# Patient Record
Sex: MALE | Race: WHITE | NOT HISPANIC OR LATINO | Employment: FULL TIME | URBAN - METROPOLITAN AREA
[De-identification: names, ages, dates, MRNs, and addresses within clinical notes are randomized per-mention and may not be internally consistent; named-entity substitution may affect disease eponyms.]

---

## 2018-02-15 DIAGNOSIS — R10.30 LOWER ABDOMINAL PAIN: Primary | ICD-10-CM

## 2018-02-19 ENCOUNTER — HOSPITAL ENCOUNTER (OUTPATIENT)
Dept: RADIOLOGY | Facility: HOSPITAL | Age: 38
Discharge: HOME | End: 2018-02-19
Attending: GENERAL PRACTICE
Payer: OTHER GOVERNMENT

## 2018-02-19 DIAGNOSIS — R10.30 LOWER ABDOMINAL PAIN: ICD-10-CM

## 2018-02-19 PROCEDURE — 74176 CT ABD & PELVIS W/O CONTRAST: CPT | Mod: 26,,, | Performed by: RADIOLOGY

## 2018-02-19 PROCEDURE — 74176 CT ABD & PELVIS W/O CONTRAST: CPT | Mod: TC

## 2018-07-20 ENCOUNTER — HOSPITAL ENCOUNTER (EMERGENCY)
Facility: HOSPITAL | Age: 38
Discharge: HOME | End: 2018-07-20
Attending: EMERGENCY MEDICINE
Payer: OTHER GOVERNMENT

## 2018-07-20 VITALS
WEIGHT: 235 LBS | SYSTOLIC BLOOD PRESSURE: 116 MMHG | OXYGEN SATURATION: 98 % | BODY MASS INDEX: 31.83 KG/M2 | HEIGHT: 72 IN | HEART RATE: 54 BPM | DIASTOLIC BLOOD PRESSURE: 78 MMHG | TEMPERATURE: 98 F | RESPIRATION RATE: 18 BRPM

## 2018-07-20 DIAGNOSIS — R10.32 LEFT LOWER QUADRANT PAIN: Primary | ICD-10-CM

## 2018-07-20 LAB
ALBUMIN SERPL BCP-MCNC: 4 G/DL
ALP SERPL-CCNC: 87 U/L
ALT SERPL W/O P-5'-P-CCNC: 31 U/L
ANION GAP SERPL CALC-SCNC: 8 MMOL/L
AST SERPL-CCNC: 31 U/L
BASOPHILS # BLD AUTO: 0.01 K/UL
BASOPHILS NFR BLD: 0.2 %
BILIRUB SERPL-MCNC: 1.4 MG/DL
BUN SERPL-MCNC: 8 MG/DL
CALCIUM SERPL-MCNC: 9 MG/DL
CHLORIDE SERPL-SCNC: 108 MMOL/L
CO2 SERPL-SCNC: 21 MMOL/L
CREAT SERPL-MCNC: 0.9 MG/DL
DIFFERENTIAL METHOD: ABNORMAL
EOSINOPHIL # BLD AUTO: 0.3 K/UL
EOSINOPHIL NFR BLD: 6.6 %
ERYTHROCYTE [DISTWIDTH] IN BLOOD BY AUTOMATED COUNT: 13.2 %
EST. GFR  (AFRICAN AMERICAN): >60 ML/MIN/1.73 M^2
EST. GFR  (NON AFRICAN AMERICAN): >60 ML/MIN/1.73 M^2
GLUCOSE SERPL-MCNC: 108 MG/DL
HCT VFR BLD AUTO: 39.2 %
HGB BLD-MCNC: 14.3 G/DL
LIPASE SERPL-CCNC: 15 U/L
LYMPHOCYTES # BLD AUTO: 1.6 K/UL
LYMPHOCYTES NFR BLD: 31.5 %
MCH RBC QN AUTO: 30 PG
MCHC RBC AUTO-ENTMCNC: 36.5 G/DL
MCV RBC AUTO: 82 FL
MONOCYTES # BLD AUTO: 0.4 K/UL
MONOCYTES NFR BLD: 8.3 %
NEUTROPHILS # BLD AUTO: 2.8 K/UL
NEUTROPHILS NFR BLD: 53.2 %
PLATELET # BLD AUTO: 206 K/UL
PMV BLD AUTO: 11 FL
POTASSIUM SERPL-SCNC: 4.2 MMOL/L
PROT SERPL-MCNC: 7.2 G/DL
RBC # BLD AUTO: 4.76 M/UL
SODIUM SERPL-SCNC: 137 MMOL/L
WBC # BLD AUTO: 5.18 K/UL

## 2018-07-20 PROCEDURE — 25500020 PHARM REV CODE 255: Performed by: EMERGENCY MEDICINE

## 2018-07-20 PROCEDURE — 85025 COMPLETE CBC W/AUTO DIFF WBC: CPT

## 2018-07-20 PROCEDURE — 83690 ASSAY OF LIPASE: CPT

## 2018-07-20 PROCEDURE — 80053 COMPREHEN METABOLIC PANEL: CPT

## 2018-07-20 PROCEDURE — 99284 EMERGENCY DEPT VISIT MOD MDM: CPT

## 2018-07-20 RX ORDER — LISINOPRIL AND HYDROCHLOROTHIAZIDE 10; 12.5 MG/1; MG/1
1 TABLET ORAL DAILY
COMMUNITY

## 2018-07-20 RX ADMIN — IOHEXOL 100 ML: 350 INJECTION, SOLUTION INTRAVENOUS at 10:07

## 2018-07-20 NOTE — DISCHARGE INSTRUCTIONS
Your lab results and CT scan in the emergency department were negative for infection or any abnormality.  You will need to follow up with her primary care provider soon as possible for continued care and management.  I placed a number of a GI specialist in her discharge paperwork.  Please call make an appointment as soon as possible to be seen for symptoms. May take Tylenol as needed for pain control.    Please return to the Emergency Department for any new or worsening symptoms including: fever, chest pain, shortness of breath, loss of consciousness, dizziness, weakness, or any other concerns.     Please follow up with your Primary Care Provider within in the week. If you do not have one, you may contact the one listed on your discharge paperwork or you may also call the [facility] Appointment Desk at [phone] to schedule an appointment with one.     Please take all medication as prescribed.

## 2018-07-20 NOTE — ED PROVIDER NOTES
Encounter Date: 7/20/2018    SCRIBE #1 NOTE: I, Emanuel Meléndez, am scribing for, and in the presence of,  Tresa Hernandez NP. I have scribed the following portions of the note - Other sections scribed: ROS, HPI.       History     Chief Complaint   Patient presents with    Abdominal Pain     lower abd pain w/ blood in stool x 3 days.  denies n/v/d or fever.  denies trauma.  Hx: diverticulitis, colitis, etc.  described as bright red blood.     CC: Abdominal Pain    HPI: Patient is a 37 y.o. M with a past medical history of Chronic colitis; Diverticulitis; GERD and Hypertension who presents to the ED for evaluation of LLQ abdominal pain which began acutely last night. Over the last 2 days, he reports bloody stools and noticed drops of blood on the toilet paper. He has a history of internal hemorrhoids.  He has a history of diverticulitis with the last flare-up being approximately 3 years ago.  He denies dietary indiscretion, constipation, ASA/NSAID use, fever, chills, nausea, and/or emesis or diarrhea.  He has not established care with a local GI specialist.         The history is provided by the patient. No  was used.     Review of patient's allergies indicates:  No Known Allergies  Past Medical History:   Diagnosis Date    Diverticulitis     Hypertension      Past Surgical History:   Procedure Laterality Date    ABDOMINAL SURGERY      HERNIA REPAIR      TONSILLECTOMY       History reviewed. No pertinent family history.  Social History   Substance Use Topics    Smoking status: Current Every Day Smoker     Types: Vaping with nicotine    Smokeless tobacco: Not on file    Alcohol use Yes      Comment: ocassionally     Review of Systems   Constitutional: Negative for chills and fever.   HENT: Negative for sore throat.    Respiratory: Negative for chest tightness and shortness of breath.    Cardiovascular: Negative for chest pain and palpitations.   Gastrointestinal: Positive for abdominal pain  (LLQ) and blood in stool (intermittentlu). Negative for abdominal distention, constipation, diarrhea, nausea, rectal pain and vomiting.   Genitourinary: Negative for decreased urine volume, difficulty urinating, discharge, dysuria, frequency, testicular pain and urgency.   Musculoskeletal: Negative for back pain, myalgias, neck pain and neck stiffness.   Skin: Negative for rash.   Neurological: Negative for dizziness, weakness, light-headedness, numbness and headaches.   Hematological: Does not bruise/bleed easily.       Physical Exam     Initial Vitals [07/20/18 0833]   BP Pulse Resp Temp SpO2   132/87 (!) 55 14 98.1 °F (36.7 °C) 96 %      MAP       --         Physical Exam    Nursing note and vitals reviewed.  Constitutional: Vital signs are normal. He appears well-developed and well-nourished. He is not diaphoretic. He is cooperative.  Non-toxic appearance. He does not have a sickly appearance. He does not appear ill. No distress.   HENT:   Head: Normocephalic and atraumatic.   Right Ear: External ear normal.   Left Ear: External ear normal.   Nose: Nose normal.   Mouth/Throat: Oropharynx is clear and moist. No oropharyngeal exudate.   Eyes: Conjunctivae and EOM are normal. Pupils are equal, round, and reactive to light.   Neck: Normal range of motion and full passive range of motion without pain. Neck supple.   Cardiovascular: Normal rate, regular rhythm, normal heart sounds, intact distal pulses and normal pulses. Exam reveals no decreased pulses.    Pulmonary/Chest: Effort normal and breath sounds normal. No respiratory distress. He has no decreased breath sounds.   Abdominal: Soft. Normal appearance and bowel sounds are normal. There is no hepatosplenomegaly. There is tenderness in the left lower quadrant. There is no rigidity, no rebound, no guarding, no CVA tenderness, no tenderness at McBurney's point and negative Grider's sign. No hernia.       Genitourinary: Rectum normal. Rectal exam shows no external  hemorrhoid, no fissure, no mass, no tenderness, anal tone normal and guaiac negative stool. Guaiac negative stool. : Acceptable.  Musculoskeletal: Normal range of motion.   Lymphadenopathy:        Head (right side): No tonsillar adenopathy present.        Head (left side): No tonsillar adenopathy present.     He has no cervical adenopathy.   Neurological: He is alert and oriented to person, place, and time. He has normal strength and normal reflexes. No cranial nerve deficit or sensory deficit. Coordination and gait normal. GCS eye subscore is 4. GCS verbal subscore is 5. GCS motor subscore is 6.   Skin: Skin is warm, dry and intact. Capillary refill takes less than 2 seconds. No rash noted.   Psychiatric: He has a normal mood and affect. His behavior is normal. Judgment and thought content normal.         ED Course   Procedures  Labs Reviewed   CBC W/ AUTO DIFFERENTIAL - Abnormal; Notable for the following:        Result Value    Hematocrit 39.2 (*)     MCHC 36.5 (*)     All other components within normal limits   COMPREHENSIVE METABOLIC PANEL - Abnormal; Notable for the following:     CO2 21 (*)     Total Bilirubin 1.4 (*)     All other components within normal limits   LIPASE          Imaging Results          CT Abdomen Pelvis With Contrast (Final result)  Result time 07/20/18 11:15:57    Final result by Flaquito Lambert DO (07/20/18 11:15:57)                 Impression:      No acute intra-abdominal finding.    Hepatomegaly with probable fatty infiltration of the liver.    No signs of appendicitis.    Colonic diverticulosis without evidence for acute diverticulitis.    Remote operative change from presumed anterior abdominal wall repair      Electronically signed by: Flaquito Lambert DO  Date:    07/20/2018  Time:    11:15             Narrative:    EXAMINATION:  CT ABDOMEN PELVIS WITH CONTRAST    CLINICAL HISTORY:  LLQ abd pain;    TECHNIQUE:  Low dose axial images, sagittal and coronal  reformations were obtained from the lung bases to the pubic symphysis following the IV administration of 100 mL of Omnipaque 350 .  Oral contrast was not given.    COMPARISON:  02/19/2018.    FINDINGS:  There is no consolidation within the visualized lungs.  The liver is mildly enlarged measuring 19.4 cm in craniocaudal dimension.  There is geographic decreased attenuation of liver concerning for fatty infiltration.  The spleen and pancreas are normal in size without focal lesion.  The adrenal glands are within normal limits bilaterally.    The kidneys are normal in size with uptake of contrast bilaterally measuring approximately 9-10 cm in length bilaterally there is a subcentimeter hypodensity in the lower pole the right kidney which may represent a cyst though too small for further characterization.    No aneurysmal dilatation of the abdominal aorta.    Surgical clips along the lower abdominal wall likely sequela of prior hernia repair.    No signs of appendicitis with normal caliber appendix identified.    No calcified gallstones in the gallbladder by CT criteria.    Colonic diverticulosis without evidence for acute diverticulitis.    Degenerative change of the spine with grade 1 retrolisthesis of L5 on S1.  Stable multiple nonspecific punctate calcifications within the left scrotal/general soft tissues.  Previous left inguinal hernia no longer seen with question trace right inguinal hernia unchanged                              X-Rays:   Independently Interpreted Readings:   Other Readings:  CT scan abdomen shows  No acute intra-abdominal finding.    Hepatomegaly with probable fatty infiltration of the liver.    No signs of appendicitis.    Colonic diverticulosis without evidence for acute diverticulitis.    Remote operative change from presumed anterior abdominal wall repair          APC / Resident Notes:   This is an evaluation of a 37 y.o. male that presents to the Emergency Department for Abdominal Pain.  Physical Exam shows a non-toxic, afebrile, and well appearing male.  Tenderness to palpation to left lower quadrant, no CVA tenderness, no masses, guarding, rebound or rigidity, bowel sounds are present all quadrants and normoactive, do not suspect surgical abdomen at this time.  Patient has normal rectal tone, guaiac is negative, there is no external hemorrhoids on exam and no rectal pain during exam.  Distal pulses are intact and equal bilaterally. Guaiac negative during exam.    Vital Signs Are Reassuring.  RESULTS:  CBC and CMP unremarkable for electrolyte abnormality, renal insufficiency, anemia, leukocytosis. CT scan was performed and was negative for mesenteric ischemia, obstruction, appendicitis, cholecystitis, diverticulitis, colitis, and hernia.  It does show hepatomegaly with probable fatty infiltration of liver, chronic colonic diverticulosis and remote operative changes from presumed anterior abdominal wall repair.    My overall impression is abdominal pain. I considered, but at this time, do not suspect appendicitis, pancreatitis, mesenteric ischemia, obstruction, cholecystitis, peptic ulcer disease, pancreatitis, diverticulitis, colitis, volvulus, hernia, UTI.    ED Course:  Patient declined any medications in the emergency department or medications to go home with.  Additional D/C Information: Abdominal Pain Precautions, Tylenol as needed for pain. Patient verbalizes understanding of needing to follow up with GI specialist for continued care and management.  The diagnosis, treatment plan, instructions for follow-up and reevaluation with his PCP and GI specialist as well as ED return precautions were discussed and understanding was verbalized. All questions or concerns have been addressed.     This case was discussed with Dr. Finch who is in agreement with my assessment and plan.          Scribe Attestation:   Scribe #1: I performed the above scribed service and the documentation accurately  describes the services I performed. I attest to the accuracy of the note.    Attending Attestation:           Physician Attestation for Scribe:  Physician Attestation Statement for Scribe #1: I, Tresa Hernandez NP, reviewed documentation, as scribed by Emanuel Meléndez in my presence, and it is both accurate and complete.                    Clinical Impression:   The encounter diagnosis was Left lower quadrant pain.      Disposition:   Disposition: Discharged  Condition: Stable                        Tresa Hernandez NP  07/20/18 4446

## 2018-11-09 DIAGNOSIS — R22.9 LOCALIZED SWELLING, MASS AND LUMP, UNSPECIFIED: Primary | ICD-10-CM

## 2018-11-19 ENCOUNTER — HOSPITAL ENCOUNTER (OUTPATIENT)
Dept: RADIOLOGY | Facility: HOSPITAL | Age: 38
Discharge: HOME | End: 2018-11-19
Attending: PHYSICIAN ASSISTANT
Payer: OTHER GOVERNMENT

## 2018-11-19 DIAGNOSIS — R22.9 LOCALIZED SWELLING, MASS AND LUMP, UNSPECIFIED: ICD-10-CM

## 2018-11-19 PROCEDURE — 76870 US EXAM SCROTUM: CPT | Mod: TC

## 2018-11-19 PROCEDURE — 76870 US EXAM SCROTUM: CPT | Mod: 26,,, | Performed by: RADIOLOGY

## 2020-04-30 ENCOUNTER — HOSPITAL ENCOUNTER (EMERGENCY)
Facility: HOSPITAL | Age: 40
Discharge: HOME | End: 2020-04-30
Attending: EMERGENCY MEDICINE
Payer: OTHER GOVERNMENT

## 2020-04-30 VITALS
TEMPERATURE: 97 F | SYSTOLIC BLOOD PRESSURE: 124 MMHG | OXYGEN SATURATION: 96 % | DIASTOLIC BLOOD PRESSURE: 94 MMHG | WEIGHT: 250 LBS | HEART RATE: 63 BPM | BODY MASS INDEX: 33.13 KG/M2 | HEIGHT: 73 IN | RESPIRATION RATE: 18 BRPM

## 2020-04-30 DIAGNOSIS — R11.0 NAUSEA: ICD-10-CM

## 2020-04-30 DIAGNOSIS — R10.31 RLQ ABDOMINAL PAIN: Primary | ICD-10-CM

## 2020-04-30 LAB
ALBUMIN SERPL BCP-MCNC: 4.3 G/DL (ref 3.5–5.2)
ALP SERPL-CCNC: 108 U/L (ref 55–135)
ALT SERPL W/O P-5'-P-CCNC: 45 U/L (ref 10–44)
ANION GAP SERPL CALC-SCNC: 16 MMOL/L (ref 8–16)
AST SERPL-CCNC: 29 U/L (ref 10–40)
BASOPHILS # BLD AUTO: 0.03 K/UL (ref 0–0.2)
BASOPHILS NFR BLD: 0.3 % (ref 0–1.9)
BILIRUB SERPL-MCNC: 0.8 MG/DL (ref 0.1–1)
BILIRUB UR QL STRIP: NEGATIVE
BUN SERPL-MCNC: 10 MG/DL (ref 6–20)
CALCIUM SERPL-MCNC: 9.4 MG/DL (ref 8.7–10.5)
CHLORIDE SERPL-SCNC: 102 MMOL/L (ref 95–110)
CLARITY UR: CLEAR
CO2 SERPL-SCNC: 19 MMOL/L (ref 23–29)
COLOR UR: YELLOW
CREAT SERPL-MCNC: 1.1 MG/DL (ref 0.5–1.4)
DIFFERENTIAL METHOD: NORMAL
EOSINOPHIL # BLD AUTO: 0.3 K/UL (ref 0–0.5)
EOSINOPHIL NFR BLD: 2.8 % (ref 0–8)
ERYTHROCYTE [DISTWIDTH] IN BLOOD BY AUTOMATED COUNT: 12.4 % (ref 11.5–14.5)
EST. GFR  (AFRICAN AMERICAN): >60 ML/MIN/1.73 M^2
EST. GFR  (NON AFRICAN AMERICAN): >60 ML/MIN/1.73 M^2
GLUCOSE SERPL-MCNC: 104 MG/DL (ref 70–110)
GLUCOSE UR QL STRIP: NEGATIVE
HCT VFR BLD AUTO: 46.2 % (ref 40–54)
HGB BLD-MCNC: 15.6 G/DL (ref 14–18)
HGB UR QL STRIP: NEGATIVE
IMM GRANULOCYTES # BLD AUTO: 0.04 K/UL (ref 0–0.04)
IMM GRANULOCYTES NFR BLD AUTO: 0.4 % (ref 0–0.5)
KETONES UR QL STRIP: NEGATIVE
LEUKOCYTE ESTERASE UR QL STRIP: NEGATIVE
LIPASE SERPL-CCNC: 33 U/L (ref 4–60)
LYMPHOCYTES # BLD AUTO: 2.4 K/UL (ref 1–4.8)
LYMPHOCYTES NFR BLD: 24.3 % (ref 18–48)
MCH RBC QN AUTO: 29.4 PG (ref 27–31)
MCHC RBC AUTO-ENTMCNC: 33.8 G/DL (ref 32–36)
MCV RBC AUTO: 87 FL (ref 82–98)
MONOCYTES # BLD AUTO: 0.8 K/UL (ref 0.3–1)
MONOCYTES NFR BLD: 7.5 % (ref 4–15)
NEUTROPHILS # BLD AUTO: 6.5 K/UL (ref 1.8–7.7)
NEUTROPHILS NFR BLD: 64.7 % (ref 38–73)
NITRITE UR QL STRIP: NEGATIVE
NRBC BLD-RTO: 0 /100 WBC
PH UR STRIP: 5 [PH] (ref 5–8)
PLATELET # BLD AUTO: 246 K/UL (ref 150–350)
PMV BLD AUTO: 10.6 FL (ref 9.2–12.9)
POTASSIUM SERPL-SCNC: 3.8 MMOL/L (ref 3.5–5.1)
PROT SERPL-MCNC: 7.9 G/DL (ref 6–8.4)
PROT UR QL STRIP: NEGATIVE
RBC # BLD AUTO: 5.31 M/UL (ref 4.6–6.2)
SODIUM SERPL-SCNC: 137 MMOL/L (ref 136–145)
SP GR UR STRIP: 1.01 (ref 1–1.03)
URN SPEC COLLECT METH UR: NORMAL
UROBILINOGEN UR STRIP-ACNC: NEGATIVE EU/DL
WBC # BLD AUTO: 10.01 K/UL (ref 3.9–12.7)

## 2020-04-30 PROCEDURE — 85025 COMPLETE CBC W/AUTO DIFF WBC: CPT

## 2020-04-30 PROCEDURE — 63600175 PHARM REV CODE 636 W HCPCS: Performed by: NURSE PRACTITIONER

## 2020-04-30 PROCEDURE — 25500020 PHARM REV CODE 255: Performed by: NURSE PRACTITIONER

## 2020-04-30 PROCEDURE — 83690 ASSAY OF LIPASE: CPT

## 2020-04-30 PROCEDURE — 81003 URINALYSIS AUTO W/O SCOPE: CPT

## 2020-04-30 PROCEDURE — 96361 HYDRATE IV INFUSION ADD-ON: CPT

## 2020-04-30 PROCEDURE — 96374 THER/PROPH/DIAG INJ IV PUSH: CPT

## 2020-04-30 PROCEDURE — 80053 COMPREHEN METABOLIC PANEL: CPT

## 2020-04-30 PROCEDURE — 25000003 PHARM REV CODE 250: Performed by: EMERGENCY MEDICINE

## 2020-04-30 PROCEDURE — 99285 EMERGENCY DEPT VISIT HI MDM: CPT | Mod: 25

## 2020-04-30 PROCEDURE — 96375 TX/PRO/DX INJ NEW DRUG ADDON: CPT

## 2020-04-30 RX ORDER — ONDANSETRON 4 MG/1
4 TABLET, ORALLY DISINTEGRATING ORAL EVERY 6 HOURS PRN
Qty: 20 TABLET | Refills: 0 | Status: SHIPPED | OUTPATIENT
Start: 2020-04-30

## 2020-04-30 RX ORDER — KETOROLAC TROMETHAMINE 30 MG/ML
15 INJECTION, SOLUTION INTRAMUSCULAR; INTRAVENOUS
Status: COMPLETED | OUTPATIENT
Start: 2020-04-30 | End: 2020-04-30

## 2020-04-30 RX ORDER — ONDANSETRON 2 MG/ML
8 INJECTION INTRAMUSCULAR; INTRAVENOUS
Status: COMPLETED | OUTPATIENT
Start: 2020-04-30 | End: 2020-04-30

## 2020-04-30 RX ADMIN — ONDANSETRON HYDROCHLORIDE 8 MG: 2 SOLUTION INTRAMUSCULAR; INTRAVENOUS at 04:04

## 2020-04-30 RX ADMIN — IOHEXOL 75 ML: 350 INJECTION, SOLUTION INTRAVENOUS at 05:04

## 2020-04-30 RX ADMIN — SODIUM CHLORIDE 1000 ML: 0.9 INJECTION, SOLUTION INTRAVENOUS at 04:04

## 2020-04-30 RX ADMIN — KETOROLAC TROMETHAMINE 15 MG: 30 INJECTION, SOLUTION INTRAMUSCULAR at 04:04

## 2020-04-30 NOTE — ED PROVIDER NOTES
Encounter Date: 4/30/2020    SCRIBE #1 NOTE: I, Florecita Barrera, am scribing for, and in the presence of,  Gianni Cardozo NP. I have scribed the following portions of the note - Other sections scribed: HPI, ROS, PE.       History     Chief Complaint   Patient presents with    Abdominal Pain     Pt c/o RLQ abdominal pain, nausea that started last night. Denies vomiting, fever, chills. Pt reports hx of diverticulitis. Pain is 6-7/10     This is a 39 y.o. male with a PMHx of Diverticulitis who presents to the ED complaining of right lower abdominal pain that started last night. He reports having associated nausea. He states that this pain feels different than his Diverticulitis. He notes a past surgical history of hernia repair surgery and he denies a history of appendectomy. He denies fever and emesis. No other associated symptoms.    The history is provided by the patient. No  was used.     Review of patient's allergies indicates:  No Known Allergies  Past Medical History:   Diagnosis Date    Diverticulitis     Hypertension      Past Surgical History:   Procedure Laterality Date    ABDOMINAL SURGERY      HERNIA REPAIR      TONSILLECTOMY       No family history on file.  Social History     Tobacco Use    Smoking status: Current Every Day Smoker     Types: Vaping with nicotine   Substance Use Topics    Alcohol use: Yes     Comment: ocassionally    Drug use: Not on file     Review of Systems   Constitutional: Negative for fever.   HENT: Negative for sore throat.    Respiratory: Negative for shortness of breath.    Cardiovascular: Negative for chest pain.   Gastrointestinal: Positive for abdominal pain and nausea. Negative for vomiting.   Genitourinary: Negative for dysuria.   Musculoskeletal: Negative for back pain.   Skin: Negative for rash.   Neurological: Negative for weakness.   Hematological: Does not bruise/bleed easily.       Physical Exam     Initial Vitals [04/30/20 1549]   BP Pulse  Resp Temp SpO2   (!) 147/99 68 18 98.2 °F (36.8 °C) 96 %      MAP       --         Physical Exam    Nursing note and vitals reviewed.  Constitutional: He appears well-developed and well-nourished. No distress.   HENT:   Head: Normocephalic and atraumatic.   Right Ear: Tympanic membrane normal.   Left Ear: Tympanic membrane normal.   Nose: Nose normal.   Mouth/Throat: Uvula is midline, oropharynx is clear and moist and mucous membranes are normal.   Eyes: EOM are normal. Pupils are equal, round, and reactive to light.   Neck: Trachea normal, normal range of motion, full passive range of motion without pain and phonation normal. Neck supple. No stridor present. No spinous process tenderness and no muscular tenderness present. Normal range of motion present. No neck rigidity.   Cardiovascular: Normal rate, regular rhythm and normal heart sounds. Exam reveals no gallop and no friction rub.    No murmur heard.  Pulmonary/Chest: Effort normal and breath sounds normal. No respiratory distress. He has no wheezes. He has no rhonchi. He has no rales.   Abdominal: Soft. Bowel sounds are normal. He exhibits no mass. There is no tenderness. There is no rebound and no guarding.   Right lower quadrant tenderness to palpation with rebound tenderness.  Positive Rovsing's sign.   No rigidity or guarding.   Musculoskeletal: Normal range of motion.   Neurological: He is alert and oriented to person, place, and time. He has normal strength. No cranial nerve deficit or sensory deficit.   Skin: Skin is warm and dry. Capillary refill takes less than 2 seconds.   Psychiatric: He has a normal mood and affect.         ED Course   Procedures  Labs Reviewed   COMPREHENSIVE METABOLIC PANEL - Abnormal; Notable for the following components:       Result Value    CO2 19 (*)     ALT 45 (*)     All other components within normal limits   CBC W/ AUTO DIFFERENTIAL   LIPASE   URINALYSIS, REFLEX TO URINE CULTURE    Narrative:     Preferred Collection  Type->Urine, Clean Catch          Imaging Results          CT Abdomen Pelvis With Contrast (Final result)  Result time 04/30/20 18:09:34    Final result by Flora Alvarez MD (04/30/20 18:09:34)                 Impression:      No acute abdominal or pelvic pathology CT of the abdomen and pelvis with contrast.    Hepatic steatosis.  Hepatosplenomegaly.    Colonic diverticulosis without definite evidence for acute diverticulitis.    No evidence of acute appendicitis.      Electronically signed by: Flora Alvarez  Date:    04/30/2020  Time:    18:09             Narrative:    EXAMINATION:  CT OF ABDOMEN PELVIS WITH    CLINICAL HISTORY:  RLQ pain, appendicitis suspected;    TECHNIQUE:  5 mm enhanced axial images were obtained from the lung bases through the greater trochanters.  Seventy-five mL of Omnipaque 350 was injected.    COMPARISON:  07/20/2018    FINDINGS:  Hepatosplenomegaly is noted.  There is fatty infiltration of the liver.    The pancreas, kidneys, and adrenal glands are unremarkable. The gallbladder contains no calcified gallstones.    There is no definite evidence for abdominal adenopathy or ascites.    There are no pelvic masses or adenopathy.  There is colonic diverticulosis.  The appendix is not inflamed.    There is no free fluid in the pelvis.    There is minimal lingular atelectasis.                                 Medical Decision Making:   History:   Old Medical Records: I decided to obtain old medical records.  Differential Diagnosis:   Appendicitis, colitis, diverticulitis, bowel obstruction, others  Clinical Tests:   Lab Tests: Ordered and Reviewed  Radiological Study: Ordered and Reviewed  ED Management:  HPI and physical exam as above.    Labs are unremarkable.  CT without evidence of acute intra-abdominal pathology.  Patient's symptoms are improved and abdominal tenderness is diminished following treatment in the ED. He is tolerating PO without difficulty prior to discharge.  Uncertain  etiology of the patient's symptoms, however no evidence of emergent pathology at this time. Advised patient to follow up with his PCP for re-evaluation and further management.  ED return precautions given. All questions regarding diagnosis and plan were answered to the patient's fullest possible satisfaction. Patient expressed understanding of diagnosis, discharge instructions, and return precautions.            Patient note was created using [employer] voice dictation software.  Any errors in syntax or information may not have been identified and edited prior to signing this note.              Scribe Attestation:   Scribe #1: I performed the above scribed service and the documentation accurately describes the services I performed. I attest to the accuracy of the note.                          Clinical Impression:       ICD-10-CM ICD-9-CM   1. RLQ abdominal pain R10.31 789.03   2. Nausea R11.0 787.02         Disposition:   Disposition: Discharged  Condition: Stable     ED Disposition Condition    Discharge Stable        ED Prescriptions     Medication Sig Dispense Start Date End Date Auth. Provider    ondansetron (ZOFRAN-ODT) 4 MG TbDL Take 1 tablet (4 mg total) by mouth every 6 (six) hours as needed (Nausea). 20 tablet 4/30/2020  Gianni Cardozo NP        Follow-up Information     Follow up With Specialties Details Why Contact Info    JUAN Alvares  Call   [address] [facility] [address] [phone]      [facility] Go to  If symptoms worsen, As needed [address] [place] [address] [phone]                      Scribe Attestation: I, Gianni Cardozo NP, personally performed the services described in this documentation. All medical record entries made by the scribe were at my direction and in my presence. I have reviewed the chart and agree that the record reflects my personal performance and is accurate and complete.               Gianni MANNING  VALERIANO Cardozo  05/03/20 3625

## 2020-04-30 NOTE — DISCHARGE INSTRUCTIONS
Follow-up with your gastroenterologist and PCP as discussed.  Return to the emergency department immediately for any new or worsening symptoms.    Thank you for coming to our Emergency Department today. It is important to remember that some problems are difficult to diagnose and may not be found during your first visit. Be sure to follow up with your primary care doctor.  If you do not have one, you may contact the one listed on your discharge paperwork or you may also call the [facility] Appointment Desk at [phone] to schedule an appointment with one.     Return to the ER with any questions/concerns, new/concerning symptoms, worsening or failure to improve. Do not drive or make any important decisions for 24 hours if you have received any pain medications, sedatives or mood altering drugs during your ER visit.

## 2020-04-30 NOTE — PROVIDER PROGRESS NOTES - EMERGENCY DEPT.
Emergency Department TeleTRIAGE Encounter Note      CHIEF COMPLAINT    Chief Complaint   Patient presents with    Abdominal Pain     Pt c/o RLQ abdominal pain, nausea that started last night. Denies vomiting, fever, chills. Pt reports hx of diverticulitis. Pain is 6-7/10       VITAL SIGNS   Initial Vitals [04/30/20 1549]   BP Pulse Resp Temp SpO2   (!) 147/99 68 18 98.2 °F (36.8 °C) 96 %      MAP       --            ALLERGIES    Review of patient's allergies indicates:  No Known Allergies    PROVIDER TRIAGE NOTE  [place] for his right lower quadrant abdominal pain.  They had concern for appendicitis.  Patient denies any fever.  I have ordered labs and urine tests on the patient but will defer the decision of appropriate imaging to my colleague that evaluates the patient face to face in the emergency department and examines his abdomen      ORDERS  Labs Reviewed   CBC W/ AUTO DIFFERENTIAL   COMPREHENSIVE METABOLIC PANEL   LIPASE   URINALYSIS, REFLEX TO URINE CULTURE       ED Orders (720h ago, onward)    Start Ordered     Status Ordering Provider    04/30/20 1600 04/30/20 1553  sodium chloride 0.9% bolus 1,000 mL  Once      Ordered AMINAH MEIER    04/30/20 1554 04/30/20 1553  Vital signs  Every 2 hours      Ordered AMINAH MEIER    04/30/20 1553 04/30/20 1553  Diet NPO  Diet effective now      Ordered AMINAH MEIER    04/30/20 1553 04/30/20 1553  Insert peripheral IV  Once      Ordered AMINAH MEIER    04/30/20 1553 04/30/20 1553  CBC W/ AUTO DIFFERENTIAL  STAT  Collect    Ordered AMINAH MEIER    04/30/20 1553 04/30/20 1553  Comp. Metabolic Panel  STAT  Collect    Ordered AMINAH MEIER    04/30/20 1553 04/30/20 1553  Lipase  STAT  Collect    Ordered AMINAH MEIER    04/30/20 1553 04/30/20 1553  Urinalysis, Reflex to Urine Culture Urine, Clean Catch  STAT      Ordered AMINAH MEIER            Virtual Visit Note: The provider triage portion of this  emergency department evaluation and documentation was performed via [employer]inenect, a HIPAA-compliant telemedicine application, in concert with a tele-presenter in the room. A face to face patient evaluation with one of my colleagues will occur once the patient is placed in an emergency department room.      DISCLAIMER: This note was prepared with [employer] voice recognition transcription software. Garbled syntax, mangled pronouns, and other bizarre constructions may be attributed to that software system.

## 2020-09-26 ENCOUNTER — HOSPITAL ENCOUNTER (EMERGENCY)
Facility: HOSPITAL | Age: 40
Discharge: HOME | End: 2020-09-26
Attending: EMERGENCY MEDICINE
Payer: OTHER GOVERNMENT

## 2020-09-26 VITALS
WEIGHT: 250 LBS | OXYGEN SATURATION: 95 % | RESPIRATION RATE: 18 BRPM | HEIGHT: 73 IN | SYSTOLIC BLOOD PRESSURE: 139 MMHG | DIASTOLIC BLOOD PRESSURE: 78 MMHG | TEMPERATURE: 99 F | HEART RATE: 96 BPM | BODY MASS INDEX: 33.13 KG/M2

## 2020-09-26 DIAGNOSIS — B34.9 VIRAL INFECTION: ICD-10-CM

## 2020-09-26 DIAGNOSIS — R05.9 COUGH: ICD-10-CM

## 2020-09-26 DIAGNOSIS — U07.1 COVID-19 VIRUS INFECTION: Primary | ICD-10-CM

## 2020-09-26 DIAGNOSIS — U07.1 COVID-19 VIRUS DETECTED: ICD-10-CM

## 2020-09-26 LAB
CTP QC/QA: YES
CTP QC/QA: YES
POC MOLECULAR INFLUENZA A AGN: NEGATIVE
POC MOLECULAR INFLUENZA B AGN: NEGATIVE
SARS-COV-2 RDRP RESP QL NAA+PROBE: POSITIVE

## 2020-09-26 PROCEDURE — 87502 INFLUENZA DNA AMP PROBE: CPT

## 2020-09-26 PROCEDURE — 99283 EMERGENCY DEPT VISIT LOW MDM: CPT | Mod: 25

## 2020-09-26 PROCEDURE — U0002 COVID-19 LAB TEST NON-CDC: HCPCS | Performed by: EMERGENCY MEDICINE

## 2020-09-26 RX ORDER — OMEPRAZOLE 40 MG/1
40 CAPSULE, DELAYED RELEASE ORAL
COMMUNITY
Start: 2020-07-16 | End: 2021-07-16

## 2020-09-26 RX ORDER — ATORVASTATIN CALCIUM 20 MG/1
20 TABLET, FILM COATED ORAL
COMMUNITY

## 2020-09-26 RX ORDER — HYDROCHLOROTHIAZIDE 25 MG/1
25 TABLET ORAL
COMMUNITY

## 2020-09-27 NOTE — DISCHARGE INSTRUCTIONS
You were seen in the emergency department for a fever and upper respiratory illness.  Your flu swab is negative.  Your exam and chest x-ray are otherwise reassuring. Your COVID swab is positive. For most people, the symptoms are mild and get better within a few days.  In a small percentage of cases, symptoms can progress to worsening pneumonia, respiratory failure, and severe illness.  Please monitor yourself carefully.  Please return should you begin to experience worsening shortness of breath, difficulty breathing, dizziness, or you become concerned in any other way.    Attempt to isolate your self as much as possible until 5 days after your symptoms resolve.  Avoid contact with especially vulnerable population such as the elderly or those with chronic medical conditions.

## 2020-09-27 NOTE — ED PROVIDER NOTES
"Encounter Date: 9/26/2020    SCRIBE #1 NOTE: I, Charlette James, am scribing for, and in the presence of,  Nav Greenfield MD. I have scribed the entire note.       History     Chief Complaint   Patient presents with    COVID-19 Concerns     pt c/o congestion in right lung, dull taste, head congrestion, fever and headache.      This is a 38 y/o male with a PMHx of Hypertension. He presents to the ED with a concern for COVID-19 secondary to being exposed by colleagues who tested positive for COVID and the flu 3 day ago. Symptoms include congestion, fever, headache, myalgias, coughing, head congestion, sore throat, and lack of taste, "coins." Patient took Tylenol that brought fever from 101.4 to 100.1. He does not have a history of lung related problems. Smoked cigarettes for 22 years, quit 6 months ago. His family lives in [place], as he's a [employer] in [employer] [facility]. Patient is fully educated on how to properly quarantine. NKDA    The history is provided by the patient.     Review of patient's allergies indicates:  No Known Allergies  Past Medical History:   Diagnosis Date    Diverticulitis     Hypertension      Past Surgical History:   Procedure Laterality Date    ABDOMINAL SURGERY      HERNIA REPAIR      TONSILLECTOMY       History reviewed. No pertinent family history.  Social History     Tobacco Use    Smoking status: Current Every Day Smoker     Types: Vaping with nicotine   Substance Use Topics    Alcohol use: Yes     Comment: ocassionally    Drug use: Not on file     Review of Systems   Constitutional: Positive for fever. Negative for chills, diaphoresis and fatigue.   HENT: Positive for congestion, sinus pressure and sore throat. Negative for sinus pain.         Positive for loss of taste   Eyes: Negative for pain, discharge, redness and itching.   Respiratory: Positive for cough. Negative for shortness of breath, wheezing and stridor.    Cardiovascular: Negative for chest pain, palpitations and " leg swelling.   Gastrointestinal: Negative for abdominal pain, diarrhea, nausea and vomiting.   Genitourinary: Negative for dysuria, flank pain and frequency.   Musculoskeletal: Positive for myalgias. Negative for back pain and joint swelling.   Neurological: Positive for headaches. Negative for dizziness, syncope, facial asymmetry, speech difficulty, weakness and numbness.   Psychiatric/Behavioral: Negative for confusion.       Physical Exam     Initial Vitals [09/26/20 1959]   BP Pulse Resp Temp SpO2   137/89 91 18 99.2 °F (37.3 °C) 96 %      MAP       --         Physical Exam    Constitutional: He appears well-developed and well-nourished. He is not diaphoretic. No distress.   HENT:   Head: Normocephalic and atraumatic.   Right Ear: External ear normal.   Left Ear: External ear normal.   Eyes: EOM are normal. Pupils are equal, round, and reactive to light. Right eye exhibits no discharge. Left eye exhibits no discharge.   Neck: Normal range of motion.   Cardiovascular: Normal rate.   Pulmonary/Chest: No respiratory distress.   Speaking full sentences, no respiratory distress, unlabored breathing, satting 98% while speaking during my exam   Abdominal: He exhibits no distension. There is no guarding.   Musculoskeletal: Normal range of motion. No edema.   Neurological: He is alert and oriented to person, place, and time. He has normal strength. GCS score is 15. GCS eye subscore is 4. GCS verbal subscore is 5. GCS motor subscore is 6.   Skin: Skin is warm and dry.   Psychiatric: He has a normal mood and affect. His behavior is normal.         ED Course   Procedures  Labs Reviewed   SARS-COV-2 RDRP GENE - Abnormal; Notable for the following components:       Result Value    POC Rapid COVID Positive (*)     All other components within normal limits   POCT INFLUENZA A/B MOLECULAR          Imaging Results          X-Ray Chest 1 View (Final result)  Result time 09/26/20 20:29:59    Final result by Armando Valentin MD  (09/26/20 20:29:59)                 Impression:      No radiographic evidence of pneumonia or other source of cough, noting that early/mild viral pneumonia may be radiographically occult.      Electronically signed by: Armando Valentin MD  Date:    09/26/2020  Time:    20:29             Narrative:    EXAMINATION:  XR CHEST 1 VIEW    CLINICAL HISTORY:  Cough    TECHNIQUE:  Single frontal view of the chest was performed.    COMPARISON:  CT abdomen and pelvis 4/30/20    FINDINGS:  Symmetric appearing nipple shadows project over both lung bases.  The lungs are clear, with normal appearance of pulmonary vasculature and no pleural effusion or pneumothorax.    The cardiac silhouette is normal in size. The hilar and mediastinal contours are unremarkable.    Bones are intact.  PA and lateral views can be obtained.                                 Medical Decision Making:   Initial Assessment:   39-year-old male presenting with viral illness.  On exam, well-appearing in no acute distress.  Mild fever, no tachycardia.  No tachypnea, retractions, respiratory distress.  Satting well on room air.  He has no significant pain or other complaints concerning for other infectious cause.  Symptoms presented, mainly cough, fever, body aches likely consistent with viral process. He is COVID19 positive.  He is young and has no risk factors especially concerning for expected deterioration.  I have discussed in depth the disease course, expected reasons for concern and return precautions.  I have discussed isolation and social distancing until at least 5 days post symptom resolution and give him handouts as well.  Discussed return precautions as well as symptomatic treatment.  Patient is amenable to the plan.    Independently Interpreted Test(s):   I have ordered and independently interpreted X-rays - see prior notes.  Clinical Tests:   Lab Tests: Ordered and Reviewed  Radiological Study: Ordered and Reviewed            Scribe Attestation:    Scribe #1: I performed the above scribed service and the documentation accurately describes the services I performed. I attest to the accuracy of the note.                      Clinical Impression:       ICD-10-CM ICD-9-CM   1. COVID-19 virus infection  U07.1    2. Cough  R05 786.2   3. Viral infection  B34.9 079.99                      Disposition:   Disposition: Discharged  Condition: Stable     ED Disposition Condition    Discharge Stable        ED Prescriptions     None        Follow-up Information     Follow up With Specialties Details Why Contact Info    JUAN Alvares  Schedule an appointment as soon as possible for a visit   [address] [place] [id number] [phone]      [facility]  As needed, If symptoms worsen [address]  [facility] [id number] [phone]                    I, Nav Greenfield, personally performed the services described in this documentation. All medical record entries made by the scribe were at my direction and in my presence.  I have reviewed the chart and agree that the record reflects my personal performance and is accurate and complete.                     Nav Greenfield MD  09/26/20 [id number]

## 2020-09-27 NOTE — ED TRIAGE NOTES
Pt arrived to ED with c/o COVID-19 Concerns. Pt c/o congestion in right lung, dull taste, head congrestion, fever and headache.

## 2022-07-25 ENCOUNTER — OFFICE (OUTPATIENT)
Dept: URBAN - METROPOLITAN AREA CLINIC 19 | Facility: CLINIC | Age: 42
End: 2022-07-25
Payer: OTHER GOVERNMENT

## 2022-07-25 VITALS
DIASTOLIC BLOOD PRESSURE: 83 MMHG | OXYGEN SATURATION: 99 % | HEART RATE: 62 BPM | SYSTOLIC BLOOD PRESSURE: 127 MMHG | WEIGHT: 249 LBS | HEIGHT: 73 IN

## 2022-07-25 DIAGNOSIS — K64.9 UNSPECIFIED HEMORRHOIDS: ICD-10-CM

## 2022-07-25 DIAGNOSIS — K57.30 DIVERTICULOSIS OF LARGE INTESTINE WITHOUT PERFORATION OR ABS: ICD-10-CM

## 2022-07-25 DIAGNOSIS — K92.1 MELENA: ICD-10-CM

## 2022-07-25 PROCEDURE — 99204 OFFICE O/P NEW MOD 45 MIN: CPT

## 2022-07-25 RX ORDER — HYDROCORTISONE ACETATE 25 MG/1
SUPPOSITORY RECTAL
Qty: 14 | Refills: 1 | Status: ACTIVE
Start: 2022-07-25

## 2022-07-25 RX ORDER — HYDROCORTISONE 25 MG/G
CREAM TOPICAL
Qty: 30 | Refills: 1 | Status: ACTIVE
Start: 2022-07-25

## 2022-07-25 NOTE — SERVICEHPINOTES
Mr. Rocha is a 41-year-old male here with complaints of hematochezia, hemorrhoids, and reported history of diverticulosis. The patient reports that he has had hematochezia with a bowel movement and hemorrhoids for years. He reports he recently just had lab work performed by his primary care physician that was normal. We will obtain these records. The patient states that he has had somewhat worsening symptoms over the last year. He reports that he did have a colonoscopy performed in 2017 which revealed internal hemorrhoids, and diverticulosis. He reports that he has had diverticulitis in the past. 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 He denies a family history of IBD. He states he will have hematochezia frequently with a bowel movement. He reports the blood is bright red, and painless. He denies any abdominal pain, or fecal urgency. He reports that his hemorrhoids will sometimes protrude and cause irritation but are not currently doing so. He states he has been on a diet and recently has lost approximately 16 lb intentionally. He would like a colonoscopy performed before November.

## 2022-07-25 NOTE — SERVICENOTES
Hx diverticulosis, flex-sig 2017, recent labs normal. Takes fiber supplement daily. Blood after BM. [phone]. Banding discussed with patient

## 2022-09-29 ENCOUNTER — AMBULATORY SURGICAL CENTER (OUTPATIENT)
Dept: URBAN - METROPOLITAN AREA SURGERY 2 | Facility: SURGERY | Age: 42
End: 2022-09-29
Payer: OTHER GOVERNMENT

## 2022-09-29 VITALS
HEART RATE: 58 BPM | SYSTOLIC BLOOD PRESSURE: 143 MMHG | HEART RATE: 58 BPM | OXYGEN SATURATION: 97 % | SYSTOLIC BLOOD PRESSURE: 146 MMHG | OXYGEN SATURATION: 95 % | HEART RATE: 67 BPM | HEART RATE: 76 BPM | DIASTOLIC BLOOD PRESSURE: 85 MMHG | RESPIRATION RATE: 20 BRPM | DIASTOLIC BLOOD PRESSURE: 85 MMHG | SYSTOLIC BLOOD PRESSURE: 142 MMHG | HEART RATE: 67 BPM | DIASTOLIC BLOOD PRESSURE: 93 MMHG | SYSTOLIC BLOOD PRESSURE: 143 MMHG | RESPIRATION RATE: 16 BRPM | OXYGEN SATURATION: 95 % | OXYGEN SATURATION: 95 % | OXYGEN SATURATION: 97 % | DIASTOLIC BLOOD PRESSURE: 85 MMHG | RESPIRATION RATE: 16 BRPM | TEMPERATURE: 98.2 F | HEIGHT: 73 IN | RESPIRATION RATE: 18 BRPM | OXYGEN SATURATION: 93 % | SYSTOLIC BLOOD PRESSURE: 146 MMHG | DIASTOLIC BLOOD PRESSURE: 86 MMHG | WEIGHT: 253 LBS | RESPIRATION RATE: 18 BRPM | SYSTOLIC BLOOD PRESSURE: 142 MMHG | HEART RATE: 75 BPM | DIASTOLIC BLOOD PRESSURE: 93 MMHG | SYSTOLIC BLOOD PRESSURE: 140 MMHG | OXYGEN SATURATION: 93 % | DIASTOLIC BLOOD PRESSURE: 86 MMHG | HEIGHT: 73 IN | OXYGEN SATURATION: 96 % | RESPIRATION RATE: 20 BRPM | HEART RATE: 75 BPM | HEART RATE: 67 BPM | DIASTOLIC BLOOD PRESSURE: 87 MMHG | WEIGHT: 253 LBS | SYSTOLIC BLOOD PRESSURE: 142 MMHG | SYSTOLIC BLOOD PRESSURE: 146 MMHG | HEART RATE: 76 BPM | SYSTOLIC BLOOD PRESSURE: 143 MMHG | SYSTOLIC BLOOD PRESSURE: 140 MMHG | RESPIRATION RATE: 20 BRPM | OXYGEN SATURATION: 97 % | RESPIRATION RATE: 18 BRPM | HEART RATE: 75 BPM | HEIGHT: 73 IN | HEART RATE: 58 BPM | OXYGEN SATURATION: 96 % | DIASTOLIC BLOOD PRESSURE: 93 MMHG | WEIGHT: 253 LBS | OXYGEN SATURATION: 93 % | HEART RATE: 76 BPM | SYSTOLIC BLOOD PRESSURE: 140 MMHG | TEMPERATURE: 98.2 F | DIASTOLIC BLOOD PRESSURE: 87 MMHG | TEMPERATURE: 98.2 F | DIASTOLIC BLOOD PRESSURE: 86 MMHG | RESPIRATION RATE: 16 BRPM | OXYGEN SATURATION: 96 % | DIASTOLIC BLOOD PRESSURE: 87 MMHG

## 2022-09-29 DIAGNOSIS — K92.1 MELENA: ICD-10-CM

## 2022-09-29 DIAGNOSIS — K57.30 DIVERTICULOSIS OF LARGE INTESTINE WITHOUT PERFORATION OR ABS: ICD-10-CM

## 2022-09-29 PROBLEM — Z80.0 FAMILY HISTORY OF COLON CANCER: Status: ACTIVE | Noted: 2022-09-29

## 2022-09-29 PROBLEM — Z12.11 SCREENING FOR COLONIC NEOPLASIA: Status: ACTIVE | Noted: 2022-09-29

## 2022-09-29 PROCEDURE — 45378 DIAGNOSTIC COLONOSCOPY: CPT | Performed by: INTERNAL MEDICINE
